# Patient Record
Sex: FEMALE | URBAN - METROPOLITAN AREA
[De-identification: names, ages, dates, MRNs, and addresses within clinical notes are randomized per-mention and may not be internally consistent; named-entity substitution may affect disease eponyms.]

---

## 2018-11-19 ENCOUNTER — OFFICE VISIT (OUTPATIENT)
Dept: URBAN - METROPOLITAN AREA CLINIC 62 | Facility: CLINIC | Age: 83
End: 2018-11-19
Payer: MEDICARE

## 2018-11-19 DIAGNOSIS — H04.123 DRY EYE SYNDROME OF BILATERAL LACRIMAL GLANDS: Primary | ICD-10-CM

## 2018-11-19 PROCEDURE — 92014 COMPRE OPH EXAM EST PT 1/>: CPT | Performed by: OPTOMETRIST

## 2018-11-19 PROCEDURE — 92004 COMPRE OPH EXAM NEW PT 1/>: CPT | Performed by: OPTOMETRIST

## 2018-11-19 ASSESSMENT — KERATOMETRY
OS: 43.25
OD: 43.75

## 2018-11-19 ASSESSMENT — VISUAL ACUITY
OS: 20/25
OD: 20/20

## 2018-11-19 ASSESSMENT — INTRAOCULAR PRESSURE
OD: 11
OS: 13

## 2019-06-19 ENCOUNTER — OFFICE VISIT (OUTPATIENT)
Dept: URBAN - METROPOLITAN AREA CLINIC 62 | Facility: CLINIC | Age: 84
End: 2019-06-19
Payer: MEDICARE

## 2019-06-19 DIAGNOSIS — H35.3220 EXUDATIVE AGE-RELATED MACULAR DEGENERATION OF LEFT EYE: Primary | ICD-10-CM

## 2019-06-19 PROCEDURE — 92134 CPTRZ OPH DX IMG PST SGM RTA: CPT | Performed by: OPTOMETRIST

## 2019-06-19 PROCEDURE — 92014 COMPRE OPH EXAM EST PT 1/>: CPT | Performed by: OPTOMETRIST

## 2019-06-19 ASSESSMENT — VISUAL ACUITY
OD: 20/25
OS: 20/40

## 2019-06-19 ASSESSMENT — INTRAOCULAR PRESSURE
OS: 13
OD: 11

## 2019-06-19 NOTE — IMPRESSION/PLAN
Impression: Exudative age-related macular degeneration of left eye: H35.3220. Plan: Macular degeneration, wet type, appears progressive. Will refer the patient to a retinal specialist for consult. OCT MAC done today.

## 2019-07-01 ENCOUNTER — OFFICE VISIT (OUTPATIENT)
Dept: URBAN - METROPOLITAN AREA CLINIC 62 | Facility: CLINIC | Age: 84
End: 2019-07-01
Payer: MEDICARE

## 2019-07-01 ENCOUNTER — TESTING ONLY (OUTPATIENT)
Dept: URBAN - METROPOLITAN AREA CLINIC 62 | Facility: CLINIC | Age: 84
End: 2019-07-01

## 2019-07-01 DIAGNOSIS — H52.223 REGULAR ASTIGMATISM, BILATERAL: Primary | ICD-10-CM

## 2019-07-01 DIAGNOSIS — H35.30 UNSPECIFIED MACULAR DEGENERATION: ICD-10-CM

## 2019-07-01 PROCEDURE — 99202 OFFICE O/P NEW SF 15 MIN: CPT | Performed by: OPHTHALMOLOGY

## 2019-07-01 PROCEDURE — 99213 OFFICE O/P EST LOW 20 MIN: CPT | Performed by: OPHTHALMOLOGY

## 2019-07-01 PROCEDURE — 92134 CPTRZ OPH DX IMG PST SGM RTA: CPT | Performed by: OPHTHALMOLOGY

## 2019-07-01 ASSESSMENT — INTRAOCULAR PRESSURE
OD: 14
OS: 17
OD: 14
OS: 17

## 2019-07-01 NOTE — IMPRESSION/PLAN
Impression: Puckering of macula, bilateral: H35.373. (Subtle) OU. Plan: OCT and exam shows a subtle ERM OU. Instructed to call clinic urgently if new/worsening metamorphopsia or scotoma. No treatment is required at this time. Dr. Jayde Cameron, F/u with me in 3 months for OCT DE or sooner.

## 2019-07-01 NOTE — IMPRESSION/PLAN
Impression: Unspecified macular degeneration: H35.30. OS Plan: OCT with small subfoveal inner layer schisis. Poss released VMT vs early JXT vs early lamellar hole. No concern for CNVM at this time. No intervention req'd. Observe. F/u in 3 mths or sooner prn. Cont AG use.

## 2019-12-16 ENCOUNTER — OFFICE VISIT (OUTPATIENT)
Dept: URBAN - METROPOLITAN AREA CLINIC 62 | Facility: CLINIC | Age: 84
End: 2019-12-16
Payer: MEDICARE

## 2019-12-16 PROCEDURE — 92134 CPTRZ OPH DX IMG PST SGM RTA: CPT | Performed by: OPHTHALMOLOGY

## 2019-12-16 PROCEDURE — 99214 OFFICE O/P EST MOD 30 MIN: CPT | Performed by: OPHTHALMOLOGY

## 2019-12-16 ASSESSMENT — INTRAOCULAR PRESSURE
OD: 16
OS: 16

## 2019-12-16 NOTE — IMPRESSION/PLAN
Impression: Unspecified macular degeneration: H35.30. Plan: OCT with small subfoveal inner layer schisis, stable. Poss released VMT vs early lamellar hole. No intervention req'd. Observe.

## 2019-12-16 NOTE — IMPRESSION/PLAN
Impression: Puckering of macula, bilateral: H35.373 OU. Plan: OCT and exam shows a subtle ERM OU. No intervention req'd at this time. Instructed to call clinic urgently if new/worsening metamorphopsia or scotoma. No treatment is required at this time. F/u 1 yr dfe/oct or sooner.

## 2020-10-14 ENCOUNTER — OFFICE VISIT (OUTPATIENT)
Dept: URBAN - METROPOLITAN AREA CLINIC 62 | Facility: CLINIC | Age: 85
End: 2020-10-14
Payer: MEDICARE

## 2020-10-14 DIAGNOSIS — H43.813 VITREOUS DEGENERATION, BILATERAL: ICD-10-CM

## 2020-10-14 DIAGNOSIS — H35.373 PUCKERING OF MACULA, BILATERAL: ICD-10-CM

## 2020-10-14 DIAGNOSIS — Z96.1 PRESENCE OF INTRAOCULAR LENS: ICD-10-CM

## 2020-10-14 PROCEDURE — 92014 COMPRE OPH EXAM EST PT 1/>: CPT | Performed by: OPTOMETRIST

## 2020-10-14 ASSESSMENT — VISUAL ACUITY
OD: 20/20
OS: 20/25

## 2020-10-14 ASSESSMENT — INTRAOCULAR PRESSURE
OD: 17
OS: 16

## 2020-10-14 NOTE — IMPRESSION/PLAN
Impression: Puckering of macula, bilateral: H35.373 OU. Plan: Subtle ERM OU Appears stable. Observe.

## 2020-10-14 NOTE — IMPRESSION/PLAN
Impression: Vitreous degeneration, bilateral: H43.813. Plan: Discussed diagnosis in detail with patient. Discussed signs and symptoms of PVD/floaters. Will continue to observe condition and or symptoms. Patient instructed to call if condition gets worse.